# Patient Record
(demographics unavailable — no encounter records)

---

## 2025-06-11 NOTE — PLAN
[FreeTextEntry1] : has seen pcp- blood test normal start hormone replacement rtn 2-3 months for blood work referral to [facility]

## 2025-06-11 NOTE — HISTORY OF PRESENT ILLNESS
[Patient reported PAP Smear was normal] : Patient reported PAP Smear was normal [Y] : Patient is sexually active [N] : Patient denies prior pregnancies [LMP unknown] : LMP unknown [Currently Active] : currently active [Men] : men [No] : No [Patient refuses STI testing] : Patient refuses STI testing [FreeTextEntry1] : 29 y/o pt. present for women's wellness exam Pt. also wants to discuss getting back on estrogen patches will restart hormone replacement consider seeing anusha for surrogacy went to er for gallstones and was told hcg + but upreg neg. review of poss causes- rec seeing genetic counselor and informing her younger sister to get tested feeling more dryness   [PapSmeardate] : 03/21/24 [TextBox_102] : PCOS [TextBox_9] : 13

## 2025-06-11 NOTE — PHYSICAL EXAM
[Chaperone Declined] : Chaperone offered however refused by patient, [Appropriately responsive] : appropriately responsive [Alert] : alert [No Acute Distress] : no acute distress [No Lymphadenopathy] : no lymphadenopathy [Regular Rate Rhythm] : regular rate rhythm [No Murmurs] : no murmurs [Clear to Auscultation B/L] : clear to auscultation bilaterally [Soft] : soft [Non-tender] : non-tender [Non-distended] : non-distended [No HSM] : No HSM [No Lesions] : no lesions [No Mass] : no mass [Oriented x3] : oriented x3 [Examination Of The Breasts] : a normal appearance [No Masses] : no breast masses were palpable [Labia Majora] : normal [Labia Minora] : normal [Normal] : normal [Uterine Adnexae] : normal

## 2025-06-11 NOTE — HISTORY OF PRESENT ILLNESS
[Patient reported PAP Smear was normal] : Patient reported PAP Smear was normal [Y] : Patient is sexually active [N] : Patient denies prior pregnancies [LMP unknown] : LMP unknown [Currently Active] : currently active [Men] : men [No] : No [Patient refuses STI testing] : Patient refuses STI testing [FreeTextEntry1] : 27 y/o pt. present for women's wellness exam Pt. also wants to discuss getting back on estrogen patches will restart hormone replacement consider seeing anusha for surrogacy went to er for gallstones and was told hcg + but upreg neg. review of poss causes- rec seeing genetic counselor and informing her younger sister to get tested feeling more dryness   [PapSmeardate] : 03/21/24 [TextBox_102] : PCOS [TextBox_9] : 13